# Patient Record
Sex: FEMALE | Race: BLACK OR AFRICAN AMERICAN
[De-identification: names, ages, dates, MRNs, and addresses within clinical notes are randomized per-mention and may not be internally consistent; named-entity substitution may affect disease eponyms.]

---

## 2021-11-18 ENCOUNTER — HOSPITAL ENCOUNTER (EMERGENCY)
Dept: HOSPITAL 63 - ER | Age: 66
Discharge: HOME | End: 2021-11-18
Payer: COMMERCIAL

## 2021-11-18 VITALS — HEIGHT: 63 IN | BODY MASS INDEX: 24.57 KG/M2 | WEIGHT: 138.67 LBS

## 2021-11-18 VITALS — DIASTOLIC BLOOD PRESSURE: 74 MMHG | SYSTOLIC BLOOD PRESSURE: 128 MMHG

## 2021-11-18 DIAGNOSIS — H01.001: Primary | ICD-10-CM

## 2021-11-18 DIAGNOSIS — Z88.0: ICD-10-CM

## 2021-11-18 NOTE — PHYS DOC
Past History


Past Surgical History:  Other


Additional Past Surgical Histo:  corneal transplant


Alcohol Use:  Occasionally





Adult General


Chief Complaint


Chief Complaint:  EYE PROBLEMS





HPI


HPI





Patient is a 66-year-old female who presents with right eye problem.  Onset was 

48 hours ago without any noteworthy event, trauma, ingestion, exposure or other 

known mechanism of injury.  Nothing known makes better or worse.  Patient 

reports she initially developed right upper eyelid swelling that has worsened.  

States she is that she did a couple times but otherwise it is just been getting 

larger on its own.  She is not provided any type of supportive care taken 

anything in attempt to alleviate her symptoms.  She has never had this before.  

She does wear contacts but has not been wearing contacts during current episode.





Review of Systems


Review of Systems


Fourteen body systems of review of systems have been reviewed. See HPI for 

pertinent positives and negative responses, other wise all other systems are 

negative, non-pertinent or non-contributory





Allergies


Allergies





Allergies








Coded Allergies Type Severity Reaction Last Updated Verified


 


  Penicillins Allergy Unknown  11/18/21 Yes











Physical Exam


Physical Exam


Constitutional: Well developed, well nourished, no acute distress, non-toxic 

appearance. 


HENT: Normocephalic, atraumatic, bilateral external ears normal, oropharynx mo

ist, no oral exudates, nose normal. 


Eyes: PERRLA, EOMI, conjunctiva normal.  Patient does have inflammation of right

superior lid margin with ocular irritation and matted lashes with creamy colored

discharge.  There is tearing without any obvious signs of trauma, signs of 

crepitus or streaking


Neck: Normal range of motion, no tenderness, supple, no stridor.  


Cardiovascular: Heart rate regular per monitor


Lungs & Thorax: No respiratory distress or accessory muscle use, bilateral chest

rise


Abdomen: Abdomen soft, non-tender, bowel sounds present in all quadrants, no 

guarding or rebound, nonacute abdomen.


Skin: Warm, dry, no erythema, no rash.  


Back: No tenderness, no CVA tenderness.  


Extremities: No tenderness, no cyanosis, no clubbing, ROM intact, no edema.  


Neurologic: Alert and oriented X 3, grossly normal motor & sensory function, no 

focal deficits noted. 


Psychologic: Affect normal, judgement normal, mood normal.





Current Patient Data


Vital Signs





                                   Vital Signs








  Date Time  Temp Pulse Resp B/P (MAP) Pulse Ox O2 Delivery O2 Flow Rate FiO2


 


11/18/21 08:52 98.4 73 16 128/74 (92) 100 Room Air  











EKG


EKG


[]





Radiology/Procedures


Radiology/Procedures


[]





Heart Score


C/O Chest Pain:  No


Risk Factors:


Risk Factors:  DM, Current or recent (<one month) smoker, HTN, HLP, family 

history of CAD, obesity.


Risk Scores:


Risk Factors:  DM, Current or recent (<one month) smoker, HTN, HLP, family 

history of CAD, obesity.





Course & Med Decision Making


Course & Med Decision Making


ABCs unremarkable


HPI and physical examination concerning for blepharitis of right superior 

eyelid.  No intraorbital abnormalities or involvement


Disclose little indication for further diagnostic work-up or intervention in ER 

setting.  Joint decision to discharge home with continued supportive care 

practices such as warm compresses and topical erythromycin for use of eye


Patient has outpatient eye doctor for whom she can follow-up within upcoming 5 

days for repeat evaluation which I feel is appropriate.  Strict return 

precautions discussed with good understanding verbalized by patient.  All que

stions and concerns addressed prior to departure





Dragon Disclaimer


Dragon Disclaimer


This electronic medical record was generated, in whole or in part, using a voice

 recognition dictation system.





Departure


Departure:


Impression:  


   Primary Impression:  


   Blepharitis of right upper eyelid


Disposition:  01 HOME / SELF CARE / HOMELESS


Condition:  STABLE


Referrals:  


CAROLINA DEUTSCH MD (PCP)


Patient Instructions:  Blepharitis





Additional Instructions:  


Been ongoing for over 48 hours and worsening.  Continued lid hygiene is most 

important for this condition. As discussed prior to ER departure, your vitals 

and physical exam were nonconcerning for any emergent or surgical issues.  You 

were most likely suffering from a condition called blepharitis which is 

typically self-limiting.  With that said, continue to avoid eye make-up and 

applying contacts.  Use warm compresses four times a day for at least 15 

minutes.  Joint decision was made to start topical erythromycin antibiotic 

ointment that should be applied directly on lid margin.  I would contact your 

primary care physician and eye doctor immediately after ER departure to schedule

 close follow-up within upcoming 1 to 5 days for repeat evaluation to ensure 

continued symptomatic resolution.  If any concerning signs or symptoms present 

prior to outpatient follow-up please do not hesitate to come back for repeat 

evaluation.  It was a pleasure to take care of you and I wish you the best going

 forward


Scripts


Erythromycin Base/Ethanol (ERYTHROMYCIN 2% GEL) 30 Gm Gel..gram.


1 HILDA TP BID for right eye blepharitis for 15 Days, #30 GM 0 Refills


   Prov: CHACHA TIAN DO         11/18/21











CHACHA TIAN DO                 Nov 18, 2021 09:14